# Patient Record
Sex: MALE | Race: WHITE | Employment: UNEMPLOYED | URBAN - METROPOLITAN AREA
[De-identification: names, ages, dates, MRNs, and addresses within clinical notes are randomized per-mention and may not be internally consistent; named-entity substitution may affect disease eponyms.]

---

## 2020-04-27 ENCOUNTER — HOSPITAL ENCOUNTER (OUTPATIENT)
Facility: CLINIC | Age: 55
Discharge: HOME | End: 2020-04-27
Attending: COLON & RECTAL SURGERY | Admitting: COLON & RECTAL SURGERY
Payer: COMMERCIAL

## 2020-04-27 VITALS
DIASTOLIC BLOOD PRESSURE: 72 MMHG | OXYGEN SATURATION: 96 % | RESPIRATION RATE: 9 BRPM | SYSTOLIC BLOOD PRESSURE: 130 MMHG | HEART RATE: 82 BPM

## 2020-04-27 LAB — FLEXIBLE SIGMOIDOSCOPY: NORMAL

## 2020-04-27 PROCEDURE — 88305 TISSUE EXAM BY PATHOLOGIST: CPT | Mod: 26,59 | Performed by: COLON & RECTAL SURGERY

## 2020-04-27 PROCEDURE — 25000128 H RX IP 250 OP 636: Performed by: COLON & RECTAL SURGERY

## 2020-04-27 PROCEDURE — G0500 MOD SEDAT ENDO SERVICE >5YRS: HCPCS | Performed by: COLON & RECTAL SURGERY

## 2020-04-27 PROCEDURE — 88305 TISSUE EXAM BY PATHOLOGIST: CPT | Performed by: COLON & RECTAL SURGERY

## 2020-04-27 PROCEDURE — 45331 SIGMOIDOSCOPY AND BIOPSY: CPT | Performed by: COLON & RECTAL SURGERY

## 2020-04-27 RX ORDER — HEPARIN SODIUM (PORCINE) LOCK FLUSH IV SOLN 100 UNIT/ML 100 UNIT/ML
SOLUTION INTRAVENOUS PRN
Status: DISCONTINUED | OUTPATIENT
Start: 2020-04-27 | End: 2020-04-27 | Stop reason: HOSPADM

## 2020-04-27 RX ORDER — FENTANYL CITRATE 50 UG/ML
INJECTION, SOLUTION INTRAMUSCULAR; INTRAVENOUS PRN
Status: DISCONTINUED | OUTPATIENT
Start: 2020-04-27 | End: 2020-04-27 | Stop reason: HOSPADM

## 2020-04-27 NOTE — PROGRESS NOTES
CRS Staff    55 yo M with significant comorbidities, most prominently cirrhosis of the liver with portal hypertension.  Finished total neoadjuvant therapy Feb 7, 2020.  Referred to me for TEM.  Here today to evaluate site with flex sig.  See note in provation.    Case reviewed with several colleagues.  Given guidelines from [facility] under COVID as well as his particular risk factors for surgery, plan to repeat flex sig at 16 weeks after surgery (week of May 18-22).  If complete clinical response, continue with watch and wait.  If not, plan for TEM to resect local tumor site.    Hector Brooks MD  [facility]  [phone] (office)  [phone] (pager)  [email or web address]

## 2020-04-27 NOTE — H&P
Pre-Endoscopy History and Physical     Mehdi Maher MRN# [id number]   YOB: 1965 Age: 54 year old     Date of Procedure: 4/27/2020  Primary care provider: No Ref-Primary, Physician  Type of Endoscopy: flex sig  Reason for Procedure: eval of rectal cancer  Type of Anesthesia Anticipated: Moderate Sedation    HPI:    Mehdi is a 54 year old male who will be undergoing the above procedure.      A history and physical has been performed. The patient's medications and allergies have been reviewed. The risks and benefits of the procedure including the risk of bleeding, perforation, and missed lesions as well as the sedation options and risks were discussed with the patient.  All questions were answered and informed consent was obtained.      No Known Allergies     Current Facility-Administered Medications   Medication     fentaNYL (PF) (SUBLIMAZE) injection     midazolam (VERSED) injection       No medications prior to admission.       There is no problem list on file for this patient.       Past Medical History:   Diagnosis Date     Hypertension      Rectal cancer (H)         History reviewed. No pertinent surgical history.    Social History     Tobacco Use     Smoking status: Current Every Day Smoker     Packs/day: 1.00     Smokeless tobacco: Never Used     Tobacco comment: recently quit smoking but started again    Substance Use Topics     Alcohol use: Yes     Comment: 2-3 drinks per day        History reviewed. No pertinent family history.      PHYSICAL EXAM:   /66   Resp 16   SpO2 98%  There is no height or weight on file to calculate BMI.   Mental status - alert and oriented  RESP: lungs clear  CV: RRR  AIRWAY EXAM: Mallampatti Class II (visualization of the soft palate, fauces, and uvula)    IMPRESSION   ASA Class 3 - Severe systemic disease, but not incapacitating      Signed Electronically by: Hector Brooks MD  April 27, 2020    Colorectal Surgery  [phone]  (office)  [phone] (pager)  [email or web address]

## 2020-04-28 LAB — COPATH REPORT: NORMAL

## 2020-05-04 DIAGNOSIS — Z20.822 ENCOUNTER FOR LABORATORY TESTING FOR COVID-19 VIRUS: Primary | ICD-10-CM

## 2020-05-08 ENCOUNTER — OFFICE VISIT (OUTPATIENT)
Dept: URGENT CARE | Facility: URGENT CARE | Age: 55
End: 2020-05-08
Attending: COLON & RECTAL SURGERY
Payer: COMMERCIAL

## 2020-05-08 DIAGNOSIS — Z20.822 SUSPECTED COVID-19 VIRUS INFECTION: Primary | ICD-10-CM

## 2020-05-08 DIAGNOSIS — Z20.822 ENCOUNTER FOR LABORATORY TESTING FOR COVID-19 VIRUS: ICD-10-CM

## 2020-05-08 PROCEDURE — U0003 INFECTIOUS AGENT DETECTION BY NUCLEIC ACID (DNA OR RNA); SEVERE ACUTE RESPIRATORY SYNDROME CORONAVIRUS 2 (SARS-COV-2) (CORONAVIRUS DISEASE [COVID-19]), AMPLIFIED PROBE TECHNIQUE, MAKING USE OF HIGH THROUGHPUT TECHNOLOGIES AS DESCRIBED BY CMS-2020-01-R: HCPCS | Mod: 90 | Performed by: COLON & RECTAL SURGERY

## 2020-05-08 PROCEDURE — 99000 SPECIMEN HANDLING OFFICE-LAB: CPT | Performed by: COLON & RECTAL SURGERY

## 2020-05-09 LAB
SARS-COV-2 RNA SPEC QL NAA+PROBE: NOT DETECTED
SPECIMEN SOURCE: NORMAL

## 2020-05-12 ENCOUNTER — HOSPITAL ENCOUNTER (OUTPATIENT)
Facility: CLINIC | Age: 55
Discharge: HOME | End: 2020-05-12
Attending: COLON & RECTAL SURGERY | Admitting: COLON & RECTAL SURGERY
Payer: COMMERCIAL

## 2020-05-12 VITALS — BODY MASS INDEX: 24.52 KG/M2 | HEIGHT: 73 IN | WEIGHT: 185 LBS

## 2020-05-12 PROCEDURE — 40000873 ZZH CANCELLED SURGERY UP TO 15 MINS: Performed by: COLON & RECTAL SURGERY

## 2020-05-12 RX ORDER — FUROSEMIDE 40 MG
40 TABLET ORAL DAILY
COMMUNITY
Start: 2020-02-19

## 2020-05-12 RX ORDER — ONDANSETRON 2 MG/ML
4 INJECTION INTRAMUSCULAR; INTRAVENOUS
Status: DISCONTINUED | OUTPATIENT
Start: 2020-05-12 | End: 2020-05-12 | Stop reason: HOSPADM

## 2020-05-12 RX ORDER — SPIRONOLACTONE 100 MG/1
100 TABLET, FILM COATED ORAL DAILY
COMMUNITY
Start: 2020-02-19

## 2020-05-12 RX ORDER — LIDOCAINE 40 MG/G
CREAM TOPICAL
Status: DISCONTINUED | OUTPATIENT
Start: 2020-05-12 | End: 2020-05-12 | Stop reason: HOSPADM

## 2020-05-12 ASSESSMENT — MIFFLIN-ST. JEOR: SCORE: 1723.03

## 2020-05-12 NOTE — PROGRESS NOTES
CRS Staff    Patient reviewed at [facility] Colorectal Surgery [facility] meeting.  Consensus among my colleagues is to not do local excision unless there was definitive evidence of cancer.    Patient did not do enemas today.  Reports increasing abdominal distention. On exam abdomen is soft, but distended concerning for ascities.  Also has scrotal edema.    Will cancel flex sig today.  I will be in contact with his oncologist.    Hector Brooks MD  [facility]  [phone] (office)  [phone] (pager)  [email or web address]

## 2020-10-27 ENCOUNTER — TRANSFERRED RECORDS (OUTPATIENT)
Dept: HEALTH INFORMATION MANAGEMENT | Facility: CLINIC | Age: 55
End: 2020-10-27

## 2020-11-06 ENCOUNTER — TRANSFERRED RECORDS (OUTPATIENT)
Dept: HEALTH INFORMATION MANAGEMENT | Facility: CLINIC | Age: 55
End: 2020-11-06

## 2020-11-09 ENCOUNTER — TRANSFERRED RECORDS (OUTPATIENT)
Dept: HEALTH INFORMATION MANAGEMENT | Facility: CLINIC | Age: 55
End: 2020-11-09

## 2020-11-10 ENCOUNTER — MEDICAL CORRESPONDENCE (OUTPATIENT)
Dept: HEALTH INFORMATION MANAGEMENT | Facility: CLINIC | Age: 55
End: 2020-11-10

## 2020-11-10 ENCOUNTER — TRANSFERRED RECORDS (OUTPATIENT)
Dept: HEALTH INFORMATION MANAGEMENT | Facility: CLINIC | Age: 55
End: 2020-11-10

## 2020-11-13 ENCOUNTER — TRANSCRIBE ORDERS (OUTPATIENT)
Dept: OTHER | Age: 55
End: 2020-11-13

## 2020-11-13 DIAGNOSIS — B19.20 DECOMPENSATED HCV CIRRHOSIS (H): Primary | ICD-10-CM

## 2020-11-13 DIAGNOSIS — K74.69 DECOMPENSATED HCV CIRRHOSIS (H): Primary | ICD-10-CM

## 2020-11-16 NOTE — TELEPHONE ENCOUNTER
RECORDS RECEIVED FROM: Care Everywhere   Appt Date: 11.19.2020   NOTES STATUS DETAILS   OFFICE NOTE from referring provider Care Everywhere 11.11.2020 Consult Gary Montgomery PA-C    OFFICE NOTES from other specialists N/A    [place] SUMMARY [place] hospital [facility] [place] [facility] [place]ICATION LIST Internal    LIVER BIOSPY (IF APPLICABLE)      PATHOLOGY REPORTS  N/A    IMAGING     ENDOSCOPY (IF AVAILABLE) Care Everywhere 10.29.2020   COLONOSCOPY (IF AVAILABLE) Care Everywhere 09.25.2020   ULTRASOUND LIVER Care Everywhere 10.27.2020 ULT Abd Complete   CT OF ABDOMEN Care Everywhere 10.27.2020 CT Abd Pelvis   MRI OF LIVER N/A    FIBROSCAN, US ELASTOGRAPHY, FIBROSIS SCAN, MR ELASTOGRAPHY N/A    LABS     HEPATIC PANEL (LIVER PANEL) Care Everywhere 11.07.2020   BASIC METABOLIC PANEL Care Everywhere 11.09.2020   COMPLETE METABOLIC PANEL Care Everywhere 11.08.2020   COMPLETE BLOOD COUNT (CBC) Care Everywhere 11.08.2020   INTERNATIONAL NORMALIZED RATIO (INR) N/A    HEPATITIS C ANTIBODY N/A    HEPATITIS C VIRAL LOAD/PCR N/A    HEPATITIS C GENOTYPE N/A    HEPATITIS B SURFACE ANTIGEN N/A    HEPATITIS B SURFACE ANTIBODY Care Everywhere 10.27.2020   HEPATITIS B DNA QUANT LEVEL N/A    HEPATITIS B CORE ANTIBODY N/A      Action 11.16.2020    Action Taken P[place] [facility]    11.17.2020 Sent fax request for images to [phone].    11.18.2020 [place] [facility] [place] if they [place] my fax, [phone] was told to resend fax and states the images are needed today to get the images because they receive about 600 faxes a day.    11.18.2020 All images received and uploaded to pt chart.

## 2020-11-17 ENCOUNTER — REFERRAL (OUTPATIENT)
Dept: TRANSPLANT | Facility: CLINIC | Age: 55
End: 2020-11-17

## 2020-11-17 DIAGNOSIS — B19.20 HCV (HEPATITIS C VIRUS): Primary | ICD-10-CM

## 2020-11-17 NOTE — TELEPHONE ENCOUNTER
Patient Call: Voicemail  Date/Time: 11/17/2020 @ 1:41pm  Reason for call: patient returning Angeline's phone call. No other information given.

## 2020-11-17 NOTE — LETTER
12/3/2020    Mehdi Maher  [address]  [place] [address]      Dear Mehdi,    Thank you for your interest in the Transplant Center at [facility], [facility]. We look forward to being a part of your care team and assisting you through the transplant process.    As we discussed, your transplant coordinator is Pierre Buckley Jr. (Liver).  You may call your coordinator at any time with questions or concerns call [phone].    Please complete the following.    1. Fill out and return the enclosed forms    Authorization for Electronic Communication    Authorization to Discuss Protected Health Information    Authorization for Release of Protected Health Information    Authorization for Care Everywhere Release of Information    2. Sign up for:    CellScape, access to your electronic medical record (see enclosed pamphlet)    [email or web address] box.[employer], a transplant education website    You can use these tools to learn more about your transplant, communicate with your care team, and track your medical details      Sincerely,  Solid Organ Transplant  [facility], [facility]    cc: Referring Physician

## 2020-11-19 ENCOUNTER — PRE VISIT (OUTPATIENT)
Dept: GASTROENTEROLOGY | Facility: CLINIC | Age: 55
End: 2020-11-19

## 2020-11-19 ENCOUNTER — VIRTUAL VISIT (OUTPATIENT)
Dept: GASTROENTEROLOGY | Facility: CLINIC | Age: 55
End: 2020-11-19
Attending: PHYSICIAN ASSISTANT
Payer: COMMERCIAL

## 2020-11-19 DIAGNOSIS — K74.60 CIRRHOSIS OF LIVER WITH ASCITES, UNSPECIFIED HEPATIC CIRRHOSIS TYPE (H): Primary | ICD-10-CM

## 2020-11-19 DIAGNOSIS — R18.8 CIRRHOSIS OF LIVER WITH ASCITES, UNSPECIFIED HEPATIC CIRRHOSIS TYPE (H): Primary | ICD-10-CM

## 2020-11-19 PROCEDURE — 99204 OFFICE O/P NEW MOD 45 MIN: CPT | Mod: 95 | Performed by: HOSPITALIST

## 2020-11-19 RX ORDER — FERROUS SULFATE 325(65) MG
325 TABLET ORAL EVERY OTHER DAY
COMMUNITY
Start: 2020-10-31

## 2020-11-19 RX ORDER — ALBUTEROL SULFATE 90 UG/1
1-2 AEROSOL, METERED RESPIRATORY (INHALATION) EVERY 6 HOURS PRN
COMMUNITY
Start: 2020-10-27

## 2020-11-19 RX ORDER — AMOXICILLIN 250 MG
1 CAPSULE ORAL 2 TIMES DAILY
COMMUNITY
Start: 2020-11-09

## 2020-11-19 RX ORDER — ASCORBIC ACID 500 MG
500 TABLET ORAL DAILY
COMMUNITY
Start: 2020-10-30 | End: 2020-11-29

## 2020-11-19 RX ORDER — LACTULOSE 10 G/15ML
20 SOLUTION ORAL 3 TIMES DAILY
COMMUNITY
Start: 2020-11-09

## 2020-11-19 RX ORDER — FOLIC ACID 1 MG/1
1 TABLET ORAL DAILY
COMMUNITY
Start: 2020-11-09

## 2020-11-19 RX ORDER — CHOLESTYRAMINE 4 G/9G
4 POWDER, FOR SUSPENSION ORAL 2 TIMES DAILY
COMMUNITY
Start: 2020-11-17

## 2020-11-19 ASSESSMENT — PAIN SCALES - GENERAL: PAINLEVEL: NO PAIN (0)

## 2020-11-19 NOTE — LETTER
"    11/19/2020         RE: Mehdi Maher  [address]  [place] [address]        Dear Colleague,    Thank you for referring your patient, Mehdi Maher, to the [facility] [place]. Please see a copy of my visit note below.    Mehdi Maher is a 55 year old male who is being evaluated via a billable telephone visit.      The patient has been notified of following:     \"This telephone visit will be conducted via a call between you and your physician/provider. We have found that certain health care needs can be provided without the need for a physical exam.  This service lets us provide the care you need with a short phone conversation.  If a prescription is necessary we can send it directly to your pharmacy.  If lab work is needed we can place an order for that and you can then stop by our lab to have the test done at a later time.    Telephone visits are billed at different rates depending on your insurance coverage. During this emergency period, for some insurers they may be billed the same as an in-person visit.  Please reach out to your insurance provider with any questions.    If during the course of the call the physician/provider feels a telephone visit is not appropriate, you will not be charged for this service.\"    Patient has given verbal consent for Telephone visit?  Yes    What phone number would you like to be contacted at? [phone]    How would you like to obtain your AVS? Mail a copy    Phone call duration: 35 minutes    GI CLINIC VISIT - NEW PATIENT    CC/REFERRING PROVIDER:  Gary Montgomery  REASON FOR CONSULTATION:     HPI: 55 year old male with a medical history as documented below, but pertinent for decompensated cirrhosis [mainly by ascites] and secondary to hepatitis C infection and alcohol use seen in hepatology clinic for evaluation.  [facility].  For hepatitis C, he was treated with Epclusa and " "ribavirin with attainment of sustained virological response.    On evaluation today, patient is not sure why he is seeing me.  He continues to states \"why do not you ask Gary\".  He has no new complaints today, but states that he is itching at the site of his umbilical hernia.  He denies any abdominal pain otherwise, chest pain, or shortness of breath.  He denies any melena, but states that once in a while he sees blood in his stool especially when he has constipation.  He denies any confusion, or lethargy.    Per notes, he has had recent admissions for severe anemia with unremarkable work-up.  He also recently had a complicated umbilical hernia with weeping ulceration and is currently on ciprofloxacin for this.  He was seen by the surgery service at [facility] however they did not feel comfortable with hernia repair as given his decompensated liver disease. He had an EGD on 10/29/2020 with portal hypertensive gastropathy. CT abdomen 10/27/2020 with no HCC.  He has a history of alcohol use, drinking about 12 beers per day.  He states that he has cut back.  He knows that his alcohol intake is directly related to his liver disease, as well as his weeping hernia.  He continues to smoke cigarettes, smoking 1 pack a day.    He has a history of stage III rectal adenocarcinoma diagnosed in 06/2019, treated with neoadjuvant chemo with excellent response and no evidence of residual disease. His Oncologists are following a watch and wait protocol at this time as he is deemed not to be a surgical candidate    ROS: 10pt ROS performed and otherwise negative.    PERTINENT PAST MEDICAL HISTORY:  Past Medical History:   Diagnosis Date     Hypertension      Rectal cancer (H)     hx rectal cancer       PREVIOUS ABDOMINAL/GYNECOLOGIC SURGERIES:  Past Surgical History:   Procedure Laterality Date     ORTHOPEDIC SURGERY      ankle surgery      PREVIOUS ENDOSCOPY:  EGD 10/29/2020: No EV, GV. PHG present    PERTINENT MEDICATIONS:    Current " Outpatient Medications:      albuterol (PROAIR HFA/PROVENTIL HFA/VENTOLIN HFA) 108 (90 Base) MCG/ACT inhaler, Inhale 1-2 puffs into the lungs every 6 hours as needed, Disp: , Rfl:      ascorbic acid 500 MG TABS, Take 500 mg by mouth daily, Disp: , Rfl:      cholestyramine (QUESTRAN) 4 g packet, Take 4 g by mouth 2 times daily, Disp: , Rfl:      ferrous sulfate (FEROSUL) 325 (65 Fe) MG tablet, Take 325 mg by mouth every other day, Disp: , Rfl:      folic acid (FOLVITE) 1 MG tablet, Take 1 mg by mouth daily, Disp: , Rfl:      furosemide (LASIX) 40 MG tablet, Take 40 mg by mouth daily , Disp: , Rfl:      lactulose (CHRONULAC) 10 GM/15ML solution, Take 20 g by mouth 3 times daily, Disp: , Rfl:      omeprazole (PRILOSEC) 20 MG DR capsule, Take 20 mg by mouth 2 times daily, Disp: , Rfl:      senna-docusate (SENOKOT-S/PERICOLACE) 8.6-50 MG tablet, Take 1 tablet by mouth 2 times daily, Disp: , Rfl:      spironolactone (ALDACTONE) 100 MG tablet, Take 100 mg by mouth daily , Disp: , Rfl:       SOCIAL HISTORY:  Grew up in [place], highest grade level achieved is 10th grade, lives in public housing apartment, works as doorman at [employer]   History of intermittent cocaine and THC use  Actively drinking, states that he has cut down to a 12-pack of beer per week.  Per notes, has been drinking up to 12 beers per day.   Active smoker, smokes 1PPD    FAMILY HISTORY:  No colon/panc/esophageal/other GI CA, no other HNPCC-related Ravinder. No IBD/celiac, no other AI/liver/thyroid disease.    PHYSICAL EXAMINATION:  Telephone visit    PERTINENT STUDIES:  MELD-Na 23  MELD 19  CTP 11 (Class C)    ASSESSMENT/PLAN:   55 year old male with a medical history as documented below, but pertinent for decompensated cirrhosis [mainly by ascites] and secondary to hepatitis C infection and alcohol use seen in hepatology clinic for evaluation    1. Decompensated Cirrhosis:  Decompensated disease, based on the presence of ascites  Etiology: HCV s/p SVR,  alcohol  MELD-Na of  23, Child-Ramses-Briggs score of 11 (Class C).  Hepatic encephalopathy: Not apparent  Ascites: Present, on diuretics  SBP prophylaxis: Ongoing given weeping umbilical hernia  TIPS: None  Esophageal/Gastric varices: Last EGD was done 10/29/2020 with PHG  Hepatocellular carcinoma: CT 10/27/2020 with no HCC  Transplant: Not a candidate - active drinking, no motivation to quit and no insight. Also has Stage III adenocarcinoma, monitored by Oncology.    2. Umbilical hernia: Patient has an umbilical hernia, that is currently weeping.  He is currently on antibiotics for prophylaxis.  He is CTP class C, with a MELD of 19; and so his risk of poor outcomes from surgery is quite high.  Possible options include increasing diuretics to improve fluid control.  We also discussed a TIPS procedure however he is high risk given his meld and his bilirubin.  It is important to note that his total bilirubin is 6.9, but his direct bilirubin is 3.  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  We discussed that if he is able to quit drinking, he may improve his liver function and fluid balance to a point where he is able to either undergo a TIPS procedure and repair his umbilical hernia.  Patient however does not seem very motivated to quit drinking.  We also discussed his smoking and his need to quit.  Overall I am worried the patient has little to no insights as the reason for his liver disease being alcohol use.  He is not a transplant candidate at this time, and optimizing patient for adequate management of his umbilical hernia would include smoking cessation, alcohol cessation, and optimization of diuretics.  RECOMMENDATIONS:  -- Alcohol cessation - single most important intervention  -- Smoking cessation  -- Consider increasing diuretics   -- Recommend referral to [place] for discussion for TIPS [place]  [facility] [place]  -- Ensure sodium restriction to 2000 mg per day  -- Adequate protein diet (1.2 - 1.5g/kg/day)   - Recommend multiple meals during the day, Snacks and shakes during the day/night   - Can use Ensure/Boost drinks TID  -- NOT A TRANSPLANT CANDIDATE AT THIS TIME  -- Return to be seen by Gary Montgomery at [facility]      RTC PRN, sooner if symptomatic.      Thank you for this consultation. It was a pleasure to participate in the care of this patient; please contact us with any further questions.    The visit lasted up to 35 minutes, with more than half of the time spent on counseling and education. All questions were answered to patient's satisfaction    Patient seen and discussed with staff GI physician, Dr. Andrews, who agrees with my assessment and plan.     Dimas Franco MD  Transplant Hepatology fellow  PGY 6  [phone]    Dimas Franco MD    The patient was seen and examined.  The above assessment and plan was developed jointly with the fellow.      Thank you very much for allowing me to participate in the care of this patient.  If you have any questions regarding my recommendations, please do not hesitate to contact me.         Tomer Andrews MD      Professor of Medicine  [employer]      Executive Medical Director, Solid Organ Transplant Program  [facility]

## 2020-11-19 NOTE — PROGRESS NOTES
"Mehdi Maher is a 55 year old male who is being evaluated via a billable telephone visit.      The patient has been notified of following:     \"This telephone visit will be conducted via a call between you and your physician/provider. We have found that certain health care needs can be provided without the need for a physical exam.  This service lets us provide the care you need with a short phone conversation.  If a prescription is necessary we can send it directly to your pharmacy.  If lab work is needed we can place an order for that and you can then stop by our lab to have the test done at a later time.    Telephone visits are billed at different rates depending on your insurance coverage. During this emergency period, for some insurers they may be billed the same as an in-person visit.  Please reach out to your insurance provider with any questions.    If during the course of the call the physician/provider feels a telephone visit is not appropriate, you will not be charged for this service.\"    Patient has given verbal consent for Telephone visit?  Yes    What phone number would you like to be contacted at? [phone]    How would you like to obtain your AVS? Mail a copy    Phone call duration: 35 minutes    GI CLINIC VISIT - NEW PATIENT    CC/REFERRING PROVIDER:  Gary Montgomery  REASON FOR CONSULTATION:     HPI: 55 year old male with a medical history as documented below, but pertinent for decompensated cirrhosis [mainly by ascites] and secondary to hepatitis C infection and alcohol use seen in hepatology clinic for evaluation.  He is being seen at [facility] for his liver issues.  For hepatitis C, he was treated with Epclusa and ribavirin with attainment of sustained virological response.    On evaluation today, patient is not sure why he is seeing me.  He continues to states \"why do not you ask Gary\".  He has no new complaints today, but states that he is itching at the site of his umbilical hernia.  " He denies any abdominal pain otherwise, chest pain, or shortness of breath.  He denies any melena, but states that once in a while he sees blood in his stool especially when he has constipation.  He denies any confusion, or lethargy.    Per notes, he has had recent admissions for severe anemia with unremarkable work-up.  He also recently had a complicated umbilical hernia with weeping ulceration and is currently on ciprofloxacin for this.  [place] [facility] [place] they did not feel comfortable with hernia repair as given his decompensated liver disease. He had an EGD on 10/29/2020 with portal hypertensive gastropathy. CT abdomen 10/27/2020 with no HCC.  He has a history of alcohol use, drinking about 12 beers per day.  He states that he has cut back.  He knows that his alcohol intake is directly related to his liver disease, as well as his weeping hernia.  He continues to smoke cigarettes, smoking 1 pack a day.    He has a history of stage III rectal adenocarcinoma diagnosed in 06/2019, treated with neoadjuvant chemo with excellent response and no evidence of residual disease. His Oncologists are following a watch and wait protocol at this time as he is deemed not to be a surgical candidate    ROS: 10pt ROS performed and otherwise negative.    PERTINENT PAST MEDICAL HISTORY:  Past Medical History:   Diagnosis Date     Hypertension      Rectal cancer (H)     hx rectal cancer       PREVIOUS ABDOMINAL/GYNECOLOGIC SURGERIES:  Past Surgical History:   Procedure Laterality Date     ORTHOPEDIC SURGERY      ankle surgery      PREVIOUS ENDOSCOPY:  EGD 10/29/2020: No EV, GV. PHG present    PERTINENT MEDICATIONS:    Current Outpatient Medications:      albuterol (PROAIR HFA/PROVENTIL HFA/VENTOLIN HFA) 108 (90 Base) MCG/ACT inhaler, Inhale 1-2 puffs into the lungs every 6 hours as needed, Disp: , Rfl:      ascorbic acid 500 MG TABS, Take 500 mg by mouth daily, Disp: , Rfl:      cholestyramine  (QUESTRAN) 4 g packet, Take 4 g by mouth 2 times daily, Disp: , Rfl:      ferrous sulfate (FEROSUL) 325 (65 Fe) MG tablet, Take 325 mg by mouth every other day, Disp: , Rfl:      folic acid (FOLVITE) 1 MG tablet, Take 1 mg by mouth daily, Disp: , Rfl:      furosemide (LASIX) 40 MG tablet, Take 40 mg by mouth daily , Disp: , Rfl:      lactulose (CHRONULAC) 10 GM/15ML solution, Take 20 g by mouth 3 times daily, Disp: , Rfl:      omeprazole (PRILOSEC) 20 MG DR capsule, Take 20 mg by mouth 2 times daily, Disp: , Rfl:      senna-docusate (SENOKOT-S/PERICOLACE) 8.6-50 MG tablet, Take 1 tablet by mouth 2 times daily, Disp: , Rfl:      spironolactone (ALDACTONE) 100 MG tablet, Take 100 mg by mouth daily , Disp: , Rfl:       SOCIAL HISTORY:  [place] in [place], highest grade level achieved is 10th grade, lives in public housing apartment, works as doorman at [employer]   History of intermittent cocaine and THC use  Actively drinking, states that he has cut down to a 12-pack of beer per week.  Per notes, has been drinking up to 12 beers per day.   Active smoker, smokes 1PPD    FAMILY HISTORY:  No colon/panc/esophageal/other GI CA, no other HNPCC-related Ravinder. No IBD/celiac, no other AI/liver/thyroid disease.    PHYSICAL EXAMINATION:  Telephone visit    PERTINENT STUDIES:  MELD-Na 23  MELD 19  CTP 11 (Class C)    ASSESSMENT/PLAN:   55 year old male with a medical history as documented below, but pertinent for decompensated cirrhosis [mainly by ascites] and secondary to hepatitis C infection and alcohol use seen in hepatology clinic for evaluation    1. Decompensated Cirrhosis:  Decompensated disease, based on the presence of ascites  Etiology: HCV s/p SVR, alcohol  MELD-Na of  23, Child-Ramses-Briggs score of 11 (Class C).  Hepatic encephalopathy: Not apparent  Ascites: Present, on diuretics  SBP prophylaxis: Ongoing given weeping umbilical hernia  TIPS: None  Esophageal/Gastric varices: Last EGD was done 10/29/2020 with  PHG  Hepatocellular carcinoma: CT 10/27/2020 with no HCC  Transplant: Not a candidate - active drinking, no motivation to quit and no insight. Also has Stage III adenocarcinoma, monitored by Oncology.    2. Umbilical hernia: Patient has an umbilical hernia, that is currently weeping.  He is currently on antibiotics for prophylaxis.  He is CTP class C, with a MELD of 19; and so his risk of poor outcomes from surgery is quite high.  Possible options include increasing diuretics to improve fluid control.  We also discussed a TIPS procedure however he is high risk given his meld and his bilirubin.  It is important to note that his total bilirubin is 6.9, but his direct bilirubin is 3.  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  We discussed that if he is able to quit drinking, he may improve his liver function and fluid balance to a point where he is able to either undergo a TIPS procedure and repair his umbilical hernia.  Patient however does not seem very motivated to quit drinking.  We also discussed his smoking and his need to quit.  Overall I am worried the patient has little to no insights as the reason for his liver disease being alcohol use.  He is not a transplant candidate at this time, and optimizing patient for adequate management of his umbilical hernia would include smoking cessation, alcohol cessation, and optimization of diuretics.  RECOMMENDATIONS:  -- Alcohol cessation - single most important intervention  -- Smoking cessation  -- Consider increasing diuretics   -- Recommend referral to IR for discussion for TIPS - [place] [facility]  -- Ensure sodium restriction to 2000 mg per day  -- Adequate protein diet (1.2 - 1.5g/kg/day)   - Recommend multiple meals during the day, Snacks and shakes during the day/night   - Can use Ensure/Boost drinks TID  -- NOT A TRANSPLANT CANDIDATE AT THIS TIME  -- Return  to be seen by Gary Montgomery at [facility]      RTC PRN, sooner if symptomatic.      Thank you for this consultation. It was a pleasure to participate in the care of this patient; please contact us with any further questions.    The visit lasted up to 35 minutes, with more than half of the time spent on counseling and education. All questions were answered to patient's satisfaction    Patient seen and discussed with staff GI physician, Dr. Andrews, who agrees with my assessment and plan.     Dimas Franco MD  Transplant Hepatology fellow  PGY 6  [phone]    Dimas Franco MD    The patient was seen and examined.  The above assessment and plan was developed jointly with the fellow.      Thank you very much for allowing me to participate in the care of this patient.  If you have any questions regarding my recommendations, please do not hesitate to contact me.         Tomer Andrews MD      Professor of Medicine  [employer]      Executive Medical Director, Solid Organ Transplant Program  [facility]

## 2020-12-02 VITALS — HEIGHT: 73 IN | WEIGHT: 215 LBS | BODY MASS INDEX: 28.49 KG/M2

## 2020-12-02 PROBLEM — K70.31 ASCITES DUE TO ALCOHOLIC CIRRHOSIS (H): Status: ACTIVE | Noted: 2020-12-02

## 2020-12-02 PROBLEM — E87.1 HYPONATREMIA: Status: ACTIVE | Noted: 2020-11-06

## 2020-12-02 PROBLEM — M79.89 LEFT LEG SWELLING: Status: ACTIVE | Noted: 2019-08-12

## 2020-12-02 PROBLEM — C77.5: Status: ACTIVE | Noted: 2019-09-06

## 2020-12-02 PROBLEM — D64.9 ANEMIA: Status: ACTIVE | Noted: 2019-09-06

## 2020-12-02 PROBLEM — K70.30 ALCOHOLIC CIRRHOSIS (H): Status: ACTIVE | Noted: 2020-11-08

## 2020-12-02 PROBLEM — S31.109A OPEN WOUND OF ABDOMEN: Status: ACTIVE | Noted: 2020-11-02

## 2020-12-02 PROBLEM — Z71.89 OTHER SPECIFIED COUNSELING: Chronic | Status: ACTIVE | Noted: 2019-08-28

## 2020-12-02 PROBLEM — K76.9 CHRONIC LIVER DISEASE AND CIRRHOSIS (H): Status: ACTIVE | Noted: 2020-11-08

## 2020-12-02 PROBLEM — K74.60 CHRONIC LIVER DISEASE AND CIRRHOSIS (H): Status: ACTIVE | Noted: 2020-11-08

## 2020-12-02 PROBLEM — K42.9 UMBILICAL HERNIA: Status: ACTIVE | Noted: 2020-06-16

## 2020-12-02 PROBLEM — C20: Status: ACTIVE | Noted: 2019-09-06

## 2020-12-02 PROBLEM — D62 ACUTE BLOOD LOSS ANEMIA: Status: ACTIVE | Noted: 2020-10-27

## 2020-12-02 PROBLEM — R31.0 GROSS HEMATURIA: Status: ACTIVE | Noted: 2020-02-19

## 2020-12-02 PROBLEM — K62.89 RECTAL MASS: Status: ACTIVE | Noted: 2019-06-21

## 2020-12-02 PROBLEM — J44.9 CHRONIC OBSTRUCTIVE PULMONARY DISEASE, UNSPECIFIED COPD TYPE (H): Status: ACTIVE | Noted: 2020-11-06

## 2020-12-02 SDOH — HEALTH STABILITY: MENTAL HEALTH: HOW MANY STANDARD DRINKS CONTAINING ALCOHOL DO YOU HAVE ON A TYPICAL DAY?: NOT ASKED

## 2020-12-02 SDOH — HEALTH STABILITY: MENTAL HEALTH: HOW OFTEN DO YOU HAVE A DRINK CONTAINING ALCOHOL?: NOT ASKED

## 2020-12-02 SDOH — HEALTH STABILITY: MENTAL HEALTH: HOW OFTEN DO YOU HAVE 6 OR MORE DRINKS ON ONE OCCASION?: NOT ASKED

## 2020-12-02 ASSESSMENT — MIFFLIN-ST. JEOR: SCORE: 1854.11

## 2020-12-02 NOTE — TELEPHONE ENCOUNTER
"Patient was asked the following questions during liver intake call.     Referring Provider: Gary Montgomery PA-C   Referring Diagnosis: Alcoholic Cirrhosis of the Liver/HCV  PCP: No PCP     1)Do you know why you have liver disease: Yes             If Alcoholic Cirrhosis is present when was your last drink: Alcoholic Cirrhosis              Have you ever been through treatment for alcohol: NO patient states he \"will not go to treatment, to tell him how to act.\"  2) Presence of Ascites: Yes Paracentesis: Yes  3) Presence of Hepatic Encephalopathy: Yes Medications: Yes  4) History of GI Bleeding: None  5) Oxygen Use: None  6) EGD: Yes [place] [facility] [place]: 2020  7) Colonoscopy: Yes Where: [facility] [place]:2020  8) MELD Score: 22  9) Insurance information: [employer]       Policy fuchs: Self       Subscriber/policy/ID number: [id number]      Group Number: [id number]    Referral intake process completed.  Patient is aware that after financial approval is received, medical records will be requested.   Patient confirmed for a callback from transplant coordinator on 12/10/2020.  Tentative evaluation date TBD.    Confirmed coordinator will discuss evaluation process in more detail at the time of their call.   Patient is aware of the need to arrange age appropriate cancer screening, vaccinations, and dental care.  Reminded patient to complete questionnaire, complete medical records release, and review packet prior to evaluation visit .  Assessed patient for special needs (ie--wheelchair, assistance, guardian, and ):  None  Patient instructed to call [phone] with questions.     Patient gave verbal consent during intake call to obtain medical records and documents outside of [facility]:  Yes    JOSE Gonzalez, LPN   Solid Organ Transplant          "

## 2020-12-10 NOTE — TELEPHONE ENCOUNTER
"Patient currently admitted to [facility] for ascites and decompensation.    He is going follow up with [facility] and LUI[facility] English.     He or [facility] team will contact us if patient decompensates further.    Discuss his sobriety, reported 4 weeks of sobriety. When asked if he is going and group or treatment program he stated he \"didn't need to talk with anybody\".    He stated he would discuss with referring.     Closing referral at this time.   "

## 2021-05-28 ENCOUNTER — RECORDS - HEALTHEAST (OUTPATIENT)
Dept: ADMINISTRATIVE | Facility: CLINIC | Age: 56
End: 2021-05-28

## (undated) RX ORDER — FENTANYL CITRATE 50 UG/ML
INJECTION, SOLUTION INTRAMUSCULAR; INTRAVENOUS
Status: DISPENSED
Start: 2020-04-27

## (undated) RX ORDER — HEPARIN SODIUM (PORCINE) LOCK FLUSH IV SOLN 100 UNIT/ML 100 UNIT/ML
SOLUTION INTRAVENOUS
Status: DISPENSED
Start: 2020-04-27

## (undated) RX ORDER — FENTANYL CITRATE 50 UG/ML
INJECTION, SOLUTION INTRAMUSCULAR; INTRAVENOUS
Status: DISPENSED
Start: 2020-05-12